# Patient Record
Sex: FEMALE | Race: WHITE | NOT HISPANIC OR LATINO | URBAN - METROPOLITAN AREA
[De-identification: names, ages, dates, MRNs, and addresses within clinical notes are randomized per-mention and may not be internally consistent; named-entity substitution may affect disease eponyms.]

---

## 2017-02-02 NOTE — PATIENT DISCUSSION
DIABETES WITHOUT RETINOPATHY: PATIENT INSTRUCTED TO RETURN TO PCP FOR CONTINUED BLOOD SUGAR MONITORING AND RETURN FOR FOLLOW-UP AS SCHEDULED FOR DILATED FUNDUS EXAM.

## 2017-08-07 ENCOUNTER — IMPORTED ENCOUNTER (OUTPATIENT)
Dept: URBAN - METROPOLITAN AREA CLINIC 50 | Facility: CLINIC | Age: 67
End: 2017-08-07

## 2018-05-21 ENCOUNTER — IMPORTED ENCOUNTER (OUTPATIENT)
Dept: URBAN - METROPOLITAN AREA CLINIC 50 | Facility: CLINIC | Age: 68
End: 2018-05-21

## 2018-05-22 ENCOUNTER — IMPORTED ENCOUNTER (OUTPATIENT)
Dept: URBAN - METROPOLITAN AREA CLINIC 50 | Facility: CLINIC | Age: 68
End: 2018-05-22

## 2018-06-25 ENCOUNTER — IMPORTED ENCOUNTER (OUTPATIENT)
Dept: URBAN - METROPOLITAN AREA CLINIC 50 | Facility: CLINIC | Age: 68
End: 2018-06-25

## 2018-10-02 ENCOUNTER — IMPORTED ENCOUNTER (OUTPATIENT)
Dept: URBAN - METROPOLITAN AREA CLINIC 50 | Facility: CLINIC | Age: 68
End: 2018-10-02

## 2019-01-07 ENCOUNTER — IMPORTED ENCOUNTER (OUTPATIENT)
Dept: URBAN - METROPOLITAN AREA CLINIC 50 | Facility: CLINIC | Age: 69
End: 2019-01-07

## 2019-08-19 ENCOUNTER — IMPORTED ENCOUNTER (OUTPATIENT)
Dept: URBAN - METROPOLITAN AREA CLINIC 50 | Facility: CLINIC | Age: 69
End: 2019-08-19

## 2020-02-17 ENCOUNTER — IMPORTED ENCOUNTER (OUTPATIENT)
Dept: URBAN - METROPOLITAN AREA CLINIC 50 | Facility: CLINIC | Age: 70
End: 2020-02-17

## 2020-08-24 ENCOUNTER — IMPORTED ENCOUNTER (OUTPATIENT)
Dept: URBAN - METROPOLITAN AREA CLINIC 50 | Facility: CLINIC | Age: 70
End: 2020-08-24

## 2021-02-22 ENCOUNTER — IMPORTED ENCOUNTER (OUTPATIENT)
Dept: URBAN - METROPOLITAN AREA CLINIC 50 | Facility: CLINIC | Age: 71
End: 2021-02-22

## 2021-04-18 ASSESSMENT — VISUAL ACUITY
OS_SC: 20/20
OS_SC: 20/25-1
OS_CC: J1+
OD_SC: 20/20-1
OS_SC: 20/20
OD_SC: 20/20
OD_CC: J1+@ 16 IN
OD_CC: 20/20-
OD_CC: 20/20
OS_CC: J1+@ 19 IN
OD_SC: 20/20
OS_CC: J1+@ 16 IN
OS_CC: J1+
OD_CC: J1+
OS_BAT: 20/50
OD_CC: J1
OS_CC: 20/20
OS_CC: J1+@ 14 IN
OD_CC: J1+
OS_OTHER: 20/50. 20/60.
OD_CC: J1+@ 14 IN
OD_CC: J1+
OD_CC: J1+@ 19 IN
OD_BAT: 20/50
OD_SC: 20/20
OD_CC: 20/20
OS_SC: 20/20
OS_CC: J1
OS_CC: J1+
OS_CC: 20/20
OD_SC: 20/20
OD_OTHER: 20/50. 20/60.
OS_CC: 20/20
OS_SC: 20/20
OS_CC: 20/20
OD_CC: 20/20

## 2021-04-18 ASSESSMENT — TONOMETRY
OS_IOP_MMHG: 15
OD_IOP_MMHG: 17
OS_IOP_MMHG: 19
OS_IOP_MMHG: 14
OD_IOP_MMHG: 16
OD_IOP_MMHG: 14
OS_IOP_MMHG: 14
OD_IOP_MMHG: 18
OS_IOP_MMHG: 17
OS_IOP_MMHG: 18
OS_IOP_MMHG: 17
OD_IOP_MMHG: 15
OS_IOP_MMHG: 13
OD_IOP_MMHG: 16
OD_IOP_MMHG: 13
OD_IOP_MMHG: 14

## 2021-08-13 ENCOUNTER — PREPPED CHART (OUTPATIENT)
Dept: URBAN - METROPOLITAN AREA CLINIC 52 | Facility: CLINIC | Age: 71
End: 2021-08-13

## 2021-08-16 ENCOUNTER — ANNUAL COMPREHENSIVE EXAM (OUTPATIENT)
Dept: URBAN - METROPOLITAN AREA CLINIC 52 | Facility: CLINIC | Age: 71
End: 2021-08-16

## 2021-08-16 DIAGNOSIS — H35.372: ICD-10-CM

## 2021-08-16 DIAGNOSIS — H35.3131: ICD-10-CM

## 2021-08-16 DIAGNOSIS — H43.813: ICD-10-CM

## 2021-08-16 DIAGNOSIS — H04.123: ICD-10-CM

## 2021-08-16 PROCEDURE — 92134 CPTRZ OPH DX IMG PST SGM RTA: CPT

## 2021-08-16 PROCEDURE — 92014 COMPRE OPH EXAM EST PT 1/>: CPT

## 2021-08-16 ASSESSMENT — VISUAL ACUITY
OS_SC: 20/20-1
OD_SC: 20/20
OU_CC: J1+
OD_CC: J1+
OS_CC: J1+
OU_SC: 20/20

## 2021-08-16 ASSESSMENT — TONOMETRY
OD_IOP_MMHG: 17
OS_IOP_MMHG: 17

## 2022-07-29 NOTE — PATIENT DISCUSSION
The cataracts are creating some visual symptoms that are tolerable at this time. Defers sx consult at this time.

## 2022-07-29 NOTE — PATIENT DISCUSSION
Discussed diagnosis with patient, who feels it may be becoming visually significant.  When ready to pursue surgical repair see Dr. Nahomi Colon for LID EVAL in [place].

## 2023-01-30 ENCOUNTER — ESTABLISHED PATIENT (OUTPATIENT)
Dept: URBAN - METROPOLITAN AREA CLINIC 52 | Facility: CLINIC | Age: 73
End: 2023-01-30

## 2023-01-30 DIAGNOSIS — H43.813: ICD-10-CM

## 2023-01-30 DIAGNOSIS — H35.3131: ICD-10-CM

## 2023-01-30 DIAGNOSIS — H26.493: ICD-10-CM

## 2023-01-30 DIAGNOSIS — H35.372: ICD-10-CM

## 2023-01-30 DIAGNOSIS — H35.363: ICD-10-CM

## 2023-01-30 PROCEDURE — 92134 CPTRZ OPH DX IMG PST SGM RTA: CPT

## 2023-01-30 PROCEDURE — 92014 COMPRE OPH EXAM EST PT 1/>: CPT

## 2023-01-30 ASSESSMENT — VISUAL ACUITY
OS_SC: 20/20
OU_CC: J1+
OD_SC: 20/20

## 2023-01-30 ASSESSMENT — TONOMETRY
OS_IOP_MMHG: 16
OD_IOP_MMHG: 16

## 2024-02-12 ENCOUNTER — COMPREHENSIVE EXAM (OUTPATIENT)
Dept: URBAN - METROPOLITAN AREA CLINIC 52 | Facility: CLINIC | Age: 74
End: 2024-02-12

## 2024-02-12 DIAGNOSIS — H35.372: ICD-10-CM

## 2024-02-12 DIAGNOSIS — H52.4: ICD-10-CM

## 2024-02-12 DIAGNOSIS — H26.493: ICD-10-CM

## 2024-02-12 DIAGNOSIS — H43.813: ICD-10-CM

## 2024-02-12 DIAGNOSIS — H35.3131: ICD-10-CM

## 2024-02-12 DIAGNOSIS — H04.123: ICD-10-CM

## 2024-02-12 DIAGNOSIS — H35.363: ICD-10-CM

## 2024-02-12 PROCEDURE — 92134 CPTRZ OPH DX IMG PST SGM RTA: CPT

## 2024-02-12 PROCEDURE — 99214 OFFICE O/P EST MOD 30 MIN: CPT

## 2024-02-12 PROCEDURE — 92015 DETERMINE REFRACTIVE STATE: CPT

## 2024-02-12 ASSESSMENT — TONOMETRY
OD_IOP_MMHG: 15
OS_IOP_MMHG: 16

## 2024-02-12 ASSESSMENT — VISUAL ACUITY
OD_GLARE: 20/15
OS_GLARE: 20/20
OS_SC: 20/20
OD_GLARE: 20/20
OS_GLARE: 20/20
OD_SC: 20/20-1
OU_SC: J1

## 2025-02-24 ENCOUNTER — COMPREHENSIVE EXAM (OUTPATIENT)
Age: 75
End: 2025-02-24

## 2025-02-24 DIAGNOSIS — H26.493: ICD-10-CM

## 2025-02-24 DIAGNOSIS — H43.813: ICD-10-CM

## 2025-02-24 DIAGNOSIS — H04.123: ICD-10-CM

## 2025-02-24 DIAGNOSIS — H35.363: ICD-10-CM

## 2025-02-24 DIAGNOSIS — H35.3111: ICD-10-CM

## 2025-02-24 DIAGNOSIS — H52.4: ICD-10-CM

## 2025-02-24 DIAGNOSIS — H35.372: ICD-10-CM

## 2025-02-24 PROCEDURE — 99214 OFFICE O/P EST MOD 30 MIN: CPT

## 2025-02-24 PROCEDURE — 92134 CPTRZ OPH DX IMG PST SGM RTA: CPT
